# Patient Record
Sex: FEMALE | Race: WHITE | URBAN - METROPOLITAN AREA
[De-identification: names, ages, dates, MRNs, and addresses within clinical notes are randomized per-mention and may not be internally consistent; named-entity substitution may affect disease eponyms.]

---

## 2020-01-10 ENCOUNTER — NEW PATIENT (OUTPATIENT)
Dept: URBAN - METROPOLITAN AREA CLINIC 24 | Facility: CLINIC | Age: 80
End: 2020-01-10
Payer: MEDICARE

## 2020-01-10 DIAGNOSIS — H26.493 OTHER SECONDARY CATARACT, BILATERAL: Primary | ICD-10-CM

## 2020-01-10 PROCEDURE — 92004 COMPRE OPH EXAM NEW PT 1/>: CPT | Performed by: OPTOMETRIST

## 2020-01-10 ASSESSMENT — KERATOMETRY
OS: 42.64
OD: 42.56

## 2020-01-10 ASSESSMENT — VISUAL ACUITY
OS: 20/20
OD: 20/20

## 2020-01-10 ASSESSMENT — INTRAOCULAR PRESSURE
OD: 16
OS: 14

## 2022-09-20 ENCOUNTER — OFFICE VISIT (OUTPATIENT)
Dept: URBAN - METROPOLITAN AREA CLINIC 24 | Facility: CLINIC | Age: 82
End: 2022-09-20
Payer: MEDICARE

## 2022-09-20 DIAGNOSIS — H43.813 VITREOUS DEGENERATION, BILATERAL: ICD-10-CM

## 2022-09-20 DIAGNOSIS — H02.834 DERMATOCHALASIS OF LEFT UPPER LID: ICD-10-CM

## 2022-09-20 DIAGNOSIS — H16.143 PUNCTATE KERATITIS, BILATERAL: ICD-10-CM

## 2022-09-20 DIAGNOSIS — H02.831 DERMATOCHALASIS OF RIGHT UPPER LID: ICD-10-CM

## 2022-09-20 DIAGNOSIS — H26.493 OTHER SECONDARY CATARACT, BILATERAL: Primary | ICD-10-CM

## 2022-09-20 PROCEDURE — 92014 COMPRE OPH EXAM EST PT 1/>: CPT | Performed by: OPTOMETRIST

## 2022-09-20 ASSESSMENT — INTRAOCULAR PRESSURE
OD: 17
OS: 16

## 2022-09-20 ASSESSMENT — VISUAL ACUITY
OS: 20/20
OD: 20/20

## 2022-09-20 NOTE — IMPRESSION/PLAN
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 Plan: Pt [facility]. Defers OCPLX consult per discussion.

## 2022-09-20 NOTE — IMPRESSION/PLAN
Impression: Dermatochalasis of left upper lid: H02.834. Plan: Pt [facility]. Defers OCPLX consult per discussion.

## 2023-12-27 ENCOUNTER — OFFICE VISIT (OUTPATIENT)
Dept: URBAN - METROPOLITAN AREA CLINIC 26 | Facility: CLINIC | Age: 83
End: 2023-12-27
Payer: MEDICARE

## 2023-12-27 DIAGNOSIS — H04.123 DRY EYE SYNDROME OF BILATERAL LACRIMAL GLANDS: Primary | ICD-10-CM

## 2023-12-27 DIAGNOSIS — H52.4 PRESBYOPIA: ICD-10-CM

## 2023-12-27 PROCEDURE — 92014 COMPRE OPH EXAM EST PT 1/>: CPT | Performed by: OPTOMETRIST

## 2023-12-27 ASSESSMENT — INTRAOCULAR PRESSURE
OS: 18
OD: 18

## 2023-12-27 ASSESSMENT — VISUAL ACUITY
OD: 20/25
OS: 20/30

## 2023-12-27 ASSESSMENT — KERATOMETRY
OS: 42.13
OD: 42.38